# Patient Record
Sex: FEMALE | Race: ASIAN | NOT HISPANIC OR LATINO | Employment: UNEMPLOYED | URBAN - METROPOLITAN AREA
[De-identification: names, ages, dates, MRNs, and addresses within clinical notes are randomized per-mention and may not be internally consistent; named-entity substitution may affect disease eponyms.]

---

## 2018-05-09 ENCOUNTER — OFFICE VISIT (OUTPATIENT)
Dept: MEDICAL GROUP | Facility: MEDICAL CENTER | Age: 33
End: 2018-05-09
Payer: COMMERCIAL

## 2018-05-09 VITALS
HEART RATE: 74 BPM | RESPIRATION RATE: 16 BRPM | OXYGEN SATURATION: 95 % | HEIGHT: 64 IN | BODY MASS INDEX: 25.22 KG/M2 | DIASTOLIC BLOOD PRESSURE: 70 MMHG | SYSTOLIC BLOOD PRESSURE: 118 MMHG | WEIGHT: 147.71 LBS | TEMPERATURE: 98.5 F

## 2018-05-09 DIAGNOSIS — R87.612 LGSIL ON PAP SMEAR OF CERVIX: ICD-10-CM

## 2018-05-09 DIAGNOSIS — R94.4 KIDNEY FUNCTION TEST ABNORMAL: ICD-10-CM

## 2018-05-09 DIAGNOSIS — Z86.59 HISTORY OF DEPRESSION: ICD-10-CM

## 2018-05-09 DIAGNOSIS — Z76.89 ENCOUNTER TO ESTABLISH CARE: ICD-10-CM

## 2018-05-09 DIAGNOSIS — Z86.69 HISTORY OF MIGRAINE: ICD-10-CM

## 2018-05-09 PROCEDURE — 99203 OFFICE O/P NEW LOW 30 MIN: CPT | Performed by: PHYSICIAN ASSISTANT

## 2018-05-09 ASSESSMENT — PATIENT HEALTH QUESTIONNAIRE - PHQ9: CLINICAL INTERPRETATION OF PHQ2 SCORE: 0

## 2018-05-10 PROBLEM — Z86.59 HISTORY OF DEPRESSION: Status: ACTIVE | Noted: 2018-05-10

## 2018-05-10 PROBLEM — Z86.69 HISTORY OF MIGRAINE: Status: ACTIVE | Noted: 2018-05-10

## 2018-05-10 NOTE — ASSESSMENT & PLAN NOTE
Most recent lab testing through work fall 2017 showed abnormal kidney function numbers. Unknown cause. No known hx of kidney disease. No hx of diabetes. Doesn't usually take ibuprofen or other NSAIDs. Would like to have retested.

## 2018-05-10 NOTE — PROGRESS NOTES
Chief Complaint   Patient presents with   • Establish Care   • Results     review labs   • Contraception     wants test to confirm if pregnant        HISTORY OF THE PRESENT ILLNESS: This is a 32 y.o. female new patient to our practice. This pleasant patient is here today to establish care. New to the area. Works in marketing.  got job at [email or web address] which is why they moved to [place]. Trying to get pregnant    LGSIL on Pap smear of cervix  2017. Before that all previous paps have been normal. No follow up as she moved after that test. Agrees to GYN referral now    Kidney function test abnormal  Most recent lab testing through work 2017 showed abnormal kidney function numbers. Unknown cause. No known hx of kidney disease. No hx of diabetes. Doesn't usually take ibuprofen or other NSAIDs. Would like to have retested.    History of migraine  Not currently, mostly related to drinking alcohol so just avoids that    History of depression  No current symptoms. Managed in the past with therapy      Past Medical History:   Diagnosis Date   • Depression    • Environmental allergies    • Migraines        History reviewed. No pertinent surgical history.    Family Status   Relation Status   • Mother Alive   • Father Alive   History reviewed. No pertinent family history.    Social History   Substance Use Topics   • Smoking status: Never Smoker   • Smokeless tobacco: Never Used   • Alcohol use Yes       Allergies: Patient has no known allergies.    Current Outpatient Prescriptions Ordered in [facility]   Medication Sig Dispense Refill   • Prenatal Multivit-Min-Fe-FA (PRE- PO) Take  by mouth.       No current Epic-ordered facility-administered medications on file.        Review of Systems   Constitutional: Negative for fever, chills, weight loss and malaise/fatigue.   HENT: Negative for ear pain, nosebleeds, congestion, sore throat and neck pain.    Eyes: Negative for blurred vision.   Respiratory: Negative for cough, sputum  "production, shortness of breath and wheezing.    Cardiovascular: Negative for chest pain, palpitations, orthopnea and leg swelling.   Gastrointestinal: Negative for heartburn, nausea, vomiting and abdominal pain.   Genitourinary: Negative for dysuria, urgency and frequency.   Musculoskeletal: Negative for myalgias, back pain and joint pain.   Skin: Negative for rash and itching.   Neurological: Negative for dizziness, tingling, tremors, sensory change, focal weakness and headaches.   Endo/Heme/Allergies: Does not bruise/bleed easily.   Psychiatric/Behavioral: Negative for depression, anxiety, or memory loss.     All other systems reviewed and are negative except as in HPI.    Exam: Blood pressure 118/70, pulse 74, temperature 36.9 °C (98.5 °F), resp. rate 16, height 1.626 m (5' 4\"), weight 67 kg (147 lb 11.3 oz), last menstrual period 04/14/2018, SpO2 95 %, not currently breastfeeding.  General: Normal appearing. No distress.  Pulmonary: Clear to ausculation.  Normal effort. No rales, ronchi, or wheezing.  Cardiovascular: Regular rate and rhythm without murmur. Carotid and radial pulses are intact and equal bilaterally.  Neurologic: Grossly nonfocal  Skin: Warm and dry.  No obvious lesions.  Musculoskeletal: Normal gait. No extremity cyanosis, clubbing, or edema.  Psych: Normal mood and affect. Alert and oriented x3. Judgment and insight is normal.  Assessment/Plan  1. Encounter to establish care     2. LGSIL on Pap smear of cervix  REFERRAL TO OB/GYN    HCG QUANTITATIVE   3. Kidney function test abnormal  COMP METABOLIC PANEL    MICROALBUMIN CREAT RATIO URINE    URINALYSIS,CULTURE IF INDICATED   4. History of migraine     5. History of depression       Advised to not try to get pregnant currently until she follows up with GYN on LGSIL. Patient agrees. Will contact patient with lab results.  "

## 2018-05-10 NOTE — ASSESSMENT & PLAN NOTE
9/25/2017. Before that all previous paps have been normal. No follow up as she moved after that test. Agrees to GYN referral now

## 2018-05-12 ENCOUNTER — OFFICE VISIT (OUTPATIENT)
Dept: URGENT CARE | Facility: PHYSICIAN GROUP | Age: 33
End: 2018-05-12
Payer: COMMERCIAL

## 2018-05-12 ENCOUNTER — HOSPITAL ENCOUNTER (OUTPATIENT)
Facility: MEDICAL CENTER | Age: 33
End: 2018-05-12
Attending: PHYSICIAN ASSISTANT
Payer: COMMERCIAL

## 2018-05-12 VITALS
HEIGHT: 64 IN | RESPIRATION RATE: 16 BRPM | WEIGHT: 148 LBS | SYSTOLIC BLOOD PRESSURE: 116 MMHG | BODY MASS INDEX: 25.27 KG/M2 | OXYGEN SATURATION: 98 % | TEMPERATURE: 98.2 F | DIASTOLIC BLOOD PRESSURE: 68 MMHG | HEART RATE: 75 BPM

## 2018-05-12 DIAGNOSIS — N30.00 ACUTE CYSTITIS WITHOUT HEMATURIA: ICD-10-CM

## 2018-05-12 DIAGNOSIS — Z32.01 POSITIVE URINE PREGNANCY TEST: ICD-10-CM

## 2018-05-12 DIAGNOSIS — R30.0 DYSURIA: ICD-10-CM

## 2018-05-12 LAB
APPEARANCE UR: CLEAR
BILIRUB UR STRIP-MCNC: NORMAL MG/DL
COLOR UR AUTO: YELLOW
GLUCOSE UR STRIP.AUTO-MCNC: NORMAL MG/DL
INT CON NEG: NORMAL
INT CON POS: NORMAL
KETONES UR STRIP.AUTO-MCNC: NORMAL MG/DL
LEUKOCYTE ESTERASE UR QL STRIP.AUTO: NORMAL
NITRITE UR QL STRIP.AUTO: NORMAL
PH UR STRIP.AUTO: 5 [PH] (ref 5–8)
POC URINE PREGNANCY TEST: POSITIVE
PROT UR QL STRIP: NORMAL MG/DL
RBC UR QL AUTO: NORMAL
SP GR UR STRIP.AUTO: 1.01
UROBILINOGEN UR STRIP-MCNC: NORMAL MG/DL

## 2018-05-12 PROCEDURE — 81002 URINALYSIS NONAUTO W/O SCOPE: CPT | Performed by: PHYSICIAN ASSISTANT

## 2018-05-12 PROCEDURE — 81025 URINE PREGNANCY TEST: CPT | Performed by: PHYSICIAN ASSISTANT

## 2018-05-12 PROCEDURE — 99204 OFFICE O/P NEW MOD 45 MIN: CPT | Performed by: PHYSICIAN ASSISTANT

## 2018-05-12 PROCEDURE — 87086 URINE CULTURE/COLONY COUNT: CPT

## 2018-05-12 RX ORDER — CEPHALEXIN 500 MG/1
500 CAPSULE ORAL 2 TIMES DAILY
Qty: 10 CAP | Refills: 0 | Status: SHIPPED | OUTPATIENT
Start: 2018-05-12 | End: 2018-05-17

## 2018-05-12 ASSESSMENT — ENCOUNTER SYMPTOMS
ABDOMINAL PAIN: 0
DIARRHEA: 0
FEVER: 0
CHILLS: 0
VOMITING: 0
RESPIRATORY NEGATIVE: 1
NAUSEA: 0
FLANK PAIN: 0
CARDIOVASCULAR NEGATIVE: 1

## 2018-05-12 NOTE — PROGRESS NOTES
"Subjective:      Ana Marroquin is a 32 y.o. female who presents with Dysuria (frequency x 4 days- recent positive pregnancy test)      LMP:  - . 3 home pregnancy tests positive.        Dysuria    This is a new problem. The current episode started in the past 7 days (4 days). The problem occurs every urination. The problem has been unchanged. The quality of the pain is described as burning. There has been no fever. The fever has been present for less than 1 day. There is no history of pyelonephritis. Associated symptoms include frequency and a possible pregnancy. Pertinent negatives include no chills, flank pain, hematuria, hesitancy, nausea, urgency or vomiting. She has tried nothing for the symptoms. The treatment provided no relief.       PMH:  has a past medical history of Depression; Environmental allergies; and Migraines.  MEDS:   Current Outpatient Prescriptions:   •  Prenatal Multivit-Min-Fe-FA (PRE-JONATAN PO), Take  by mouth., Disp: , Rfl:   ALLERGIES: No Known Allergies  SURGHX: No past surgical history on file.  SOCHX:  reports that she has never smoked. She has never used smokeless tobacco. She reports that she drinks alcohol. She reports that she does not use drugs.  FH: family history is not on file.      Review of Systems   Constitutional: Negative for chills and fever.   HENT: Negative.    Respiratory: Negative.    Cardiovascular: Negative.    Gastrointestinal: Negative for abdominal pain, diarrhea, nausea and vomiting.   Genitourinary: Positive for dysuria and frequency. Negative for flank pain, hematuria, hesitancy and urgency.   All other systems reviewed and are negative.      Medications, Allergies, and current problem list reviewed today in Epic  Family history reviewed with patient and is not pertinent for today's visit     Objective:     /68   Pulse 75   Temp 36.8 °C (98.2 °F)   Resp 16   Ht 1.626 m (5' 4\")   Wt 67.1 kg (148 lb)   LMP 04/15/2018   SpO2 98%   BMI 25.40 " kg/m²      Physical Exam   Constitutional: She is oriented to person, place, and time. She appears well-developed and well-nourished. No distress.   HENT:   Head: Normocephalic and atraumatic.   Eyes: Conjunctivae and EOM are normal.   Neck: Normal range of motion. Neck supple.   Cardiovascular: Normal rate, regular rhythm and normal heart sounds.    Pulmonary/Chest: Effort normal and breath sounds normal. No respiratory distress. She has no wheezes. She has no rales.   Abdominal: Soft. She exhibits no distension. There is no tenderness.   Musculoskeletal:   No flank pain or CVA tenderness   Neurological: She is alert and oriented to person, place, and time.   Skin: Skin is warm and dry. She is not diaphoretic.   Psychiatric: She has a normal mood and affect. Her behavior is normal. Judgment and thought content normal.   Nursing note and vitals reviewed.         Urinalysis: Positive leukocyte esterase  Pregnancy: Positive     Assessment/Plan:     1. Acute cystitis without hematuria  Urine Culture    cephALEXin (KEFLEX) 500 MG Cap   2. Dysuria  POCT Urinalysis    POCT PREGNANCY   3. Positive urine pregnancy test       Early pregnancy. Proper lab work already ordered by her PCP. Vital signs normal, no signs of pyelonephritis  Keflex chosen because patient is early in pregnancy.  Take full course of antibiotic  Urine culture, I will only call patient if I need to change antibiotic pending results  Significantly increase fluids  Cranberry as tolerated  Return to clinic or go to ED if symptoms worsen or persist. Indications for ED discussed at length. Patient voices understanding. Follow-up with your primary care provider in 3-5 days. Red flags discussed. All side effects of medication discussed including allergic response, GI upset, tendon injury, etc.    Please note that this dictation was created using voice recognition software. I have made every reasonable attempt to correct obvious errors, but I expect that there  are errors of grammar and possibly content that I did not discover before finalizing the note.

## 2018-05-13 DIAGNOSIS — N30.00 ACUTE CYSTITIS WITHOUT HEMATURIA: ICD-10-CM

## 2018-05-15 LAB
BACTERIA UR CULT: NORMAL
SIGNIFICANT IND 70042: NORMAL
SITE SITE: NORMAL
SOURCE SOURCE: NORMAL

## 2018-05-16 ENCOUNTER — HOSPITAL ENCOUNTER (OUTPATIENT)
Dept: LAB | Facility: MEDICAL CENTER | Age: 33
End: 2018-05-16
Attending: PHYSICIAN ASSISTANT
Payer: COMMERCIAL

## 2018-05-16 DIAGNOSIS — R94.4 KIDNEY FUNCTION TEST ABNORMAL: ICD-10-CM

## 2018-05-16 DIAGNOSIS — R87.612 LGSIL ON PAP SMEAR OF CERVIX: ICD-10-CM

## 2018-05-16 LAB
ALBUMIN SERPL BCP-MCNC: 4.2 G/DL (ref 3.2–4.9)
ALBUMIN/GLOB SERPL: 1.3 G/DL
ALP SERPL-CCNC: 53 U/L (ref 30–99)
ALT SERPL-CCNC: 13 U/L (ref 2–50)
ANION GAP SERPL CALC-SCNC: 5 MMOL/L (ref 0–11.9)
APPEARANCE UR: CLEAR
AST SERPL-CCNC: 16 U/L (ref 12–45)
B-HCG SERPL-ACNC: 718.3 MIU/ML (ref 0–5)
BILIRUB SERPL-MCNC: 0.4 MG/DL (ref 0.1–1.5)
BILIRUB UR QL STRIP.AUTO: NEGATIVE
BUN SERPL-MCNC: 14 MG/DL (ref 8–22)
CALCIUM SERPL-MCNC: 9.8 MG/DL (ref 8.5–10.5)
CHLORIDE SERPL-SCNC: 103 MMOL/L (ref 96–112)
CO2 SERPL-SCNC: 27 MMOL/L (ref 20–33)
COLOR UR: YELLOW
CREAT SERPL-MCNC: 0.6 MG/DL (ref 0.5–1.4)
CREAT UR-MCNC: 142.8 MG/DL
GLOBULIN SER CALC-MCNC: 3.3 G/DL (ref 1.9–3.5)
GLUCOSE SERPL-MCNC: 97 MG/DL (ref 65–99)
GLUCOSE UR STRIP.AUTO-MCNC: NEGATIVE MG/DL
KETONES UR STRIP.AUTO-MCNC: NEGATIVE MG/DL
LEUKOCYTE ESTERASE UR QL STRIP.AUTO: NEGATIVE
MICRO URNS: NORMAL
MICROALBUMIN UR-MCNC: <0.7 MG/DL
MICROALBUMIN/CREAT UR: NORMAL MG/G (ref 0–30)
NITRITE UR QL STRIP.AUTO: NEGATIVE
PH UR STRIP.AUTO: 6 [PH]
POTASSIUM SERPL-SCNC: 4.3 MMOL/L (ref 3.6–5.5)
PROT SERPL-MCNC: 7.5 G/DL (ref 6–8.2)
PROT UR QL STRIP: NEGATIVE MG/DL
RBC UR QL AUTO: NEGATIVE
SODIUM SERPL-SCNC: 135 MMOL/L (ref 135–145)
SP GR UR STRIP.AUTO: 1.03
UROBILINOGEN UR STRIP.AUTO-MCNC: 0.2 MG/DL

## 2018-05-16 PROCEDURE — 80053 COMPREHEN METABOLIC PANEL: CPT

## 2018-05-16 PROCEDURE — 84702 CHORIONIC GONADOTROPIN TEST: CPT

## 2018-05-16 PROCEDURE — 82043 UR ALBUMIN QUANTITATIVE: CPT

## 2018-05-16 PROCEDURE — 82570 ASSAY OF URINE CREATININE: CPT

## 2018-05-16 PROCEDURE — 36415 COLL VENOUS BLD VENIPUNCTURE: CPT

## 2018-05-16 PROCEDURE — 81003 URINALYSIS AUTO W/O SCOPE: CPT

## 2018-05-17 ENCOUNTER — TELEPHONE (OUTPATIENT)
Dept: MEDICAL GROUP | Facility: MEDICAL CENTER | Age: 33
End: 2018-05-17

## 2018-05-17 DIAGNOSIS — Z3A.01 LESS THAN 8 WEEKS GESTATION OF PREGNANCY: ICD-10-CM

## 2018-05-17 DIAGNOSIS — R87.612 LGSIL ON PAP SMEAR OF CERVIX: ICD-10-CM

## 2018-10-30 ENCOUNTER — APPOINTMENT (OUTPATIENT)
Dept: OTHER | Facility: IMAGING CENTER | Age: 33
End: 2018-10-30

## 2018-11-01 ENCOUNTER — HOSPITAL ENCOUNTER (OUTPATIENT)
Dept: LAB | Facility: MEDICAL CENTER | Age: 33
End: 2018-11-01
Attending: OBSTETRICS & GYNECOLOGY
Payer: COMMERCIAL

## 2018-11-01 PROCEDURE — 87624 HPV HI-RISK TYP POOLED RSLT: CPT

## 2018-11-01 PROCEDURE — 88175 CYTOPATH C/V AUTO FLUID REDO: CPT

## 2018-11-03 LAB — CYTOLOGY REG CYTOL: NORMAL

## 2018-11-09 LAB
HPV HR 12 DNA CVX QL NAA+PROBE: POSITIVE
HPV16 DNA SPEC QL NAA+PROBE: NEGATIVE
HPV18 DNA SPEC QL NAA+PROBE: NEGATIVE
SPECIMEN SOURCE: ABNORMAL

## 2018-11-10 ENCOUNTER — APPOINTMENT (OUTPATIENT)
Dept: OTHER | Facility: IMAGING CENTER | Age: 33
End: 2018-11-10

## 2018-12-09 ENCOUNTER — APPOINTMENT (OUTPATIENT)
Dept: OTHER | Facility: IMAGING CENTER | Age: 33
End: 2018-12-09

## 2018-12-11 ENCOUNTER — HOSPITAL ENCOUNTER (OUTPATIENT)
Dept: LAB | Facility: MEDICAL CENTER | Age: 33
End: 2018-12-11
Attending: OBSTETRICS & GYNECOLOGY
Payer: COMMERCIAL

## 2018-12-11 PROCEDURE — 87150 DNA/RNA AMPLIFIED PROBE: CPT

## 2018-12-11 PROCEDURE — 87081 CULTURE SCREEN ONLY: CPT

## 2018-12-12 LAB — GP B STREP DNA SPEC QL NAA+PROBE: NEGATIVE

## 2019-01-16 ENCOUNTER — HOSPITAL ENCOUNTER (INPATIENT)
Facility: MEDICAL CENTER | Age: 34
LOS: 1 days | End: 2019-01-17
Attending: OBSTETRICS & GYNECOLOGY | Admitting: OBSTETRICS & GYNECOLOGY
Payer: COMMERCIAL

## 2019-01-16 ENCOUNTER — APPOINTMENT (OUTPATIENT)
Dept: OBGYN | Facility: MEDICAL CENTER | Age: 34
End: 2019-01-16
Attending: OBSTETRICS & GYNECOLOGY
Payer: COMMERCIAL

## 2019-01-16 LAB
BASOPHILS # BLD AUTO: 0.4 % (ref 0–1.8)
BASOPHILS # BLD: 0.02 K/UL (ref 0–0.12)
EOSINOPHIL # BLD AUTO: 0.05 K/UL (ref 0–0.51)
EOSINOPHIL NFR BLD: 0.9 % (ref 0–6.9)
ERYTHROCYTE [DISTWIDTH] IN BLOOD BY AUTOMATED COUNT: 46.6 FL (ref 35.9–50)
ERYTHROCYTE [DISTWIDTH] IN BLOOD BY AUTOMATED COUNT: 48.8 FL (ref 35.9–50)
GLUCOSE BLD-MCNC: 121 MG/DL (ref 65–99)
GLUCOSE BLD-MCNC: 95 MG/DL (ref 65–99)
GLUCOSE BLD-MCNC: 99 MG/DL (ref 65–99)
HCT VFR BLD AUTO: 37.6 % (ref 37–47)
HCT VFR BLD AUTO: 38.8 % (ref 37–47)
HGB BLD-MCNC: 12.2 G/DL (ref 12–16)
HGB BLD-MCNC: 12.8 G/DL (ref 12–16)
HOLDING TUBE BB 8507: NORMAL
IMM GRANULOCYTES # BLD AUTO: 0.01 K/UL (ref 0–0.11)
IMM GRANULOCYTES NFR BLD AUTO: 0.2 % (ref 0–0.9)
LYMPHOCYTES # BLD AUTO: 1.22 K/UL (ref 1–4.8)
LYMPHOCYTES NFR BLD: 22.8 % (ref 22–41)
MCH RBC QN AUTO: 28.6 PG (ref 27–33)
MCH RBC QN AUTO: 29.3 PG (ref 27–33)
MCHC RBC AUTO-ENTMCNC: 32.4 G/DL (ref 33.6–35)
MCHC RBC AUTO-ENTMCNC: 33 G/DL (ref 33.6–35)
MCV RBC AUTO: 86.8 FL (ref 81.4–97.8)
MCV RBC AUTO: 90.2 FL (ref 81.4–97.8)
MONOCYTES # BLD AUTO: 0.47 K/UL (ref 0–0.85)
MONOCYTES NFR BLD AUTO: 8.8 % (ref 0–13.4)
NEUTROPHILS # BLD AUTO: 3.58 K/UL (ref 2–7.15)
NEUTROPHILS NFR BLD: 66.9 % (ref 44–72)
NRBC # BLD AUTO: 0 K/UL
NRBC BLD-RTO: 0 /100 WBC
PLATELET # BLD AUTO: 148 K/UL (ref 164–446)
PLATELET # BLD AUTO: 152 K/UL (ref 164–446)
PMV BLD AUTO: 12.8 FL (ref 9–12.9)
PMV BLD AUTO: 12.9 FL (ref 9–12.9)
RBC # BLD AUTO: 4.17 M/UL (ref 4.2–5.4)
RBC # BLD AUTO: 4.47 M/UL (ref 4.2–5.4)
WBC # BLD AUTO: 5.4 K/UL (ref 4.8–10.8)
WBC # BLD AUTO: 9.1 K/UL (ref 4.8–10.8)

## 2019-01-16 PROCEDURE — 700111 HCHG RX REV CODE 636 W/ 250 OVERRIDE (IP): Performed by: OBSTETRICS & GYNECOLOGY

## 2019-01-16 PROCEDURE — 700102 HCHG RX REV CODE 250 W/ 637 OVERRIDE(OP): Performed by: OBSTETRICS & GYNECOLOGY

## 2019-01-16 PROCEDURE — 85027 COMPLETE CBC AUTOMATED: CPT

## 2019-01-16 PROCEDURE — 770002 HCHG ROOM/CARE - OB PRIVATE (112)

## 2019-01-16 PROCEDURE — 304965 HCHG RECOVERY SERVICES

## 2019-01-16 PROCEDURE — 3E033VJ INTRODUCTION OF OTHER HORMONE INTO PERIPHERAL VEIN, PERCUTANEOUS APPROACH: ICD-10-PCS | Performed by: OBSTETRICS & GYNECOLOGY

## 2019-01-16 PROCEDURE — 0KQM0ZZ REPAIR PERINEUM MUSCLE, OPEN APPROACH: ICD-10-PCS | Performed by: OBSTETRICS & GYNECOLOGY

## 2019-01-16 PROCEDURE — 10907ZC DRAINAGE OF AMNIOTIC FLUID, THERAPEUTIC FROM PRODUCTS OF CONCEPTION, VIA NATURAL OR ARTIFICIAL OPENING: ICD-10-PCS | Performed by: OBSTETRICS & GYNECOLOGY

## 2019-01-16 PROCEDURE — 700105 HCHG RX REV CODE 258: Performed by: OBSTETRICS & GYNECOLOGY

## 2019-01-16 PROCEDURE — 59409 OBSTETRICAL CARE: CPT

## 2019-01-16 PROCEDURE — 303615 HCHG EPIDURAL/SPINAL ANESTHESIA FOR LABOR

## 2019-01-16 PROCEDURE — 85025 COMPLETE CBC W/AUTO DIFF WBC: CPT

## 2019-01-16 PROCEDURE — A9270 NON-COVERED ITEM OR SERVICE: HCPCS | Performed by: OBSTETRICS & GYNECOLOGY

## 2019-01-16 PROCEDURE — 82962 GLUCOSE BLOOD TEST: CPT | Mod: 91

## 2019-01-16 PROCEDURE — 36415 COLL VENOUS BLD VENIPUNCTURE: CPT

## 2019-01-16 PROCEDURE — 700111 HCHG RX REV CODE 636 W/ 250 OVERRIDE (IP)

## 2019-01-16 RX ORDER — SODIUM CHLORIDE, SODIUM LACTATE, POTASSIUM CHLORIDE, AND CALCIUM CHLORIDE .6; .31; .03; .02 G/100ML; G/100ML; G/100ML; G/100ML
250 INJECTION, SOLUTION INTRAVENOUS PRN
Status: DISCONTINUED | OUTPATIENT
Start: 2019-01-16 | End: 2019-01-16 | Stop reason: HOSPADM

## 2019-01-16 RX ORDER — DOCUSATE SODIUM 100 MG/1
100 CAPSULE, LIQUID FILLED ORAL 2 TIMES DAILY PRN
Status: DISCONTINUED | OUTPATIENT
Start: 2019-01-16 | End: 2019-01-17 | Stop reason: HOSPADM

## 2019-01-16 RX ORDER — ROPIVACAINE HYDROCHLORIDE 2 MG/ML
INJECTION, SOLUTION EPIDURAL; INFILTRATION; PERINEURAL
Status: COMPLETED
Start: 2019-01-16 | End: 2019-01-16

## 2019-01-16 RX ORDER — METHYLERGONOVINE MALEATE 0.2 MG/ML
0.2 INJECTION INTRAVENOUS
Status: DISCONTINUED | OUTPATIENT
Start: 2019-01-16 | End: 2019-01-17 | Stop reason: HOSPADM

## 2019-01-16 RX ORDER — SODIUM CHLORIDE, SODIUM LACTATE, POTASSIUM CHLORIDE, CALCIUM CHLORIDE 600; 310; 30; 20 MG/100ML; MG/100ML; MG/100ML; MG/100ML
INJECTION, SOLUTION INTRAVENOUS PRN
Status: DISCONTINUED | OUTPATIENT
Start: 2019-01-16 | End: 2019-01-17 | Stop reason: HOSPADM

## 2019-01-16 RX ORDER — ONDANSETRON 2 MG/ML
4 INJECTION INTRAMUSCULAR; INTRAVENOUS EVERY 6 HOURS PRN
Status: DISCONTINUED | OUTPATIENT
Start: 2019-01-16 | End: 2019-01-17 | Stop reason: HOSPADM

## 2019-01-16 RX ORDER — IBUPROFEN 600 MG/1
600 TABLET ORAL EVERY 6 HOURS PRN
Status: DISCONTINUED | OUTPATIENT
Start: 2019-01-16 | End: 2019-01-16

## 2019-01-16 RX ORDER — ONDANSETRON 4 MG/1
4 TABLET, ORALLY DISINTEGRATING ORAL EVERY 6 HOURS PRN
Status: DISCONTINUED | OUTPATIENT
Start: 2019-01-16 | End: 2019-01-17 | Stop reason: HOSPADM

## 2019-01-16 RX ORDER — VITAMIN A ACETATE, BETA CAROTENE, ASCORBIC ACID, CHOLECALCIFEROL, .ALPHA.-TOCOPHEROL ACETATE, DL-, THIAMINE MONONITRATE, RIBOFLAVIN, NIACINAMIDE, PYRIDOXINE HYDROCHLORIDE, FOLIC ACID, CYANOCOBALAMIN, CALCIUM CARBONATE, FERROUS FUMARATE, ZINC OXIDE, CUPRIC OXIDE 3080; 12; 120; 400; 1; 1.84; 3; 20; 22; 920; 25; 200; 27; 10; 2 [IU]/1; UG/1; MG/1; [IU]/1; MG/1; MG/1; MG/1; MG/1; MG/1; [IU]/1; MG/1; MG/1; MG/1; MG/1; MG/1
1 TABLET, FILM COATED ORAL EVERY MORNING
Status: DISCONTINUED | OUTPATIENT
Start: 2019-01-17 | End: 2019-01-17 | Stop reason: HOSPADM

## 2019-01-16 RX ORDER — ROPIVACAINE HYDROCHLORIDE 2 MG/ML
INJECTION, SOLUTION EPIDURAL; INFILTRATION; PERINEURAL CONTINUOUS
Status: DISCONTINUED | OUTPATIENT
Start: 2019-01-16 | End: 2019-01-16

## 2019-01-16 RX ORDER — DEXTROSE, SODIUM CHLORIDE, SODIUM LACTATE, POTASSIUM CHLORIDE, AND CALCIUM CHLORIDE 5; .6; .31; .03; .02 G/100ML; G/100ML; G/100ML; G/100ML; G/100ML
INJECTION, SOLUTION INTRAVENOUS CONTINUOUS
Status: DISCONTINUED | OUTPATIENT
Start: 2019-01-16 | End: 2019-01-16

## 2019-01-16 RX ORDER — OXYCODONE HYDROCHLORIDE AND ACETAMINOPHEN 5; 325 MG/1; MG/1
1 TABLET ORAL EVERY 4 HOURS PRN
Status: DISCONTINUED | OUTPATIENT
Start: 2019-01-16 | End: 2019-01-17 | Stop reason: HOSPADM

## 2019-01-16 RX ORDER — LIDOCAINE HYDROCHLORIDE AND EPINEPHRINE 15; 5 MG/ML; UG/ML
INJECTION, SOLUTION EPIDURAL
Status: DISCONTINUED
Start: 2019-01-16 | End: 2019-01-16 | Stop reason: HOSPADM

## 2019-01-16 RX ORDER — CARBOPROST TROMETHAMINE 250 UG/ML
250 INJECTION, SOLUTION INTRAMUSCULAR
Status: DISCONTINUED | OUTPATIENT
Start: 2019-01-16 | End: 2019-01-17 | Stop reason: HOSPADM

## 2019-01-16 RX ORDER — SODIUM CHLORIDE, SODIUM LACTATE, POTASSIUM CHLORIDE, AND CALCIUM CHLORIDE .6; .31; .03; .02 G/100ML; G/100ML; G/100ML; G/100ML
1000 INJECTION, SOLUTION INTRAVENOUS
Status: DISCONTINUED | OUTPATIENT
Start: 2019-01-16 | End: 2019-01-16 | Stop reason: HOSPADM

## 2019-01-16 RX ORDER — OXYCODONE HYDROCHLORIDE AND ACETAMINOPHEN 5; 325 MG/1; MG/1
2 TABLET ORAL EVERY 4 HOURS PRN
Status: DISCONTINUED | OUTPATIENT
Start: 2019-01-16 | End: 2019-01-17 | Stop reason: HOSPADM

## 2019-01-16 RX ORDER — MISOPROSTOL 200 UG/1
800 TABLET ORAL
Status: DISCONTINUED | OUTPATIENT
Start: 2019-01-16 | End: 2019-01-16 | Stop reason: HOSPADM

## 2019-01-16 RX ORDER — MISOPROSTOL 200 UG/1
800 TABLET ORAL
Status: DISCONTINUED | OUTPATIENT
Start: 2019-01-16 | End: 2019-01-17 | Stop reason: HOSPADM

## 2019-01-16 RX ORDER — HYDROCODONE BITARTRATE AND ACETAMINOPHEN 5; 325 MG/1; MG/1
1-2 TABLET ORAL EVERY 6 HOURS PRN
Status: DISCONTINUED | OUTPATIENT
Start: 2019-01-16 | End: 2019-01-17 | Stop reason: HOSPADM

## 2019-01-16 RX ORDER — IBUPROFEN 600 MG/1
600 TABLET ORAL EVERY 6 HOURS PRN
Status: DISCONTINUED | OUTPATIENT
Start: 2019-01-16 | End: 2019-01-17 | Stop reason: HOSPADM

## 2019-01-16 RX ORDER — BUPIVACAINE HYDROCHLORIDE 2.5 MG/ML
INJECTION, SOLUTION EPIDURAL; INFILTRATION; INTRACAUDAL
Status: DISCONTINUED
Start: 2019-01-16 | End: 2019-01-16 | Stop reason: HOSPADM

## 2019-01-16 RX ORDER — SODIUM CHLORIDE, SODIUM LACTATE, POTASSIUM CHLORIDE, CALCIUM CHLORIDE 600; 310; 30; 20 MG/100ML; MG/100ML; MG/100ML; MG/100ML
INJECTION, SOLUTION INTRAVENOUS CONTINUOUS
Status: DISPENSED | OUTPATIENT
Start: 2019-01-16 | End: 2019-01-16

## 2019-01-16 RX ORDER — ALUMINA, MAGNESIA, AND SIMETHICONE 2400; 2400; 240 MG/30ML; MG/30ML; MG/30ML
30 SUSPENSION ORAL EVERY 6 HOURS PRN
Status: DISCONTINUED | OUTPATIENT
Start: 2019-01-16 | End: 2019-01-16 | Stop reason: HOSPADM

## 2019-01-16 RX ADMIN — Medication 1 MILLI-UNITS/MIN: at 06:16

## 2019-01-16 RX ADMIN — SODIUM CHLORIDE, POTASSIUM CHLORIDE, SODIUM LACTATE AND CALCIUM CHLORIDE: 600; 310; 30; 20 INJECTION, SOLUTION INTRAVENOUS at 06:16

## 2019-01-16 RX ADMIN — IBUPROFEN 600 MG: 600 TABLET, FILM COATED ORAL at 22:09

## 2019-01-16 RX ADMIN — ROPIVACAINE HYDROCHLORIDE 100 ML: 2 INJECTION, SOLUTION EPIDURAL; INFILTRATION; PERINEURAL at 11:02

## 2019-01-16 RX ADMIN — SODIUM CHLORIDE, POTASSIUM CHLORIDE, SODIUM LACTATE AND CALCIUM CHLORIDE: 600; 310; 30; 20 INJECTION, SOLUTION INTRAVENOUS at 11:12

## 2019-01-16 RX ADMIN — ROPIVACAINE HYDROCHLORIDE 100 ML: 2 INJECTION, SOLUTION EPIDURAL; INFILTRATION at 11:02

## 2019-01-16 RX ADMIN — Medication 125 ML/HR: at 14:32

## 2019-01-16 RX ADMIN — SODIUM CHLORIDE, POTASSIUM CHLORIDE, SODIUM LACTATE AND CALCIUM CHLORIDE: 600; 310; 30; 20 INJECTION, SOLUTION INTRAVENOUS at 10:45

## 2019-01-16 ASSESSMENT — PATIENT HEALTH QUESTIONNAIRE - PHQ9
2. FEELING DOWN, DEPRESSED, IRRITABLE, OR HOPELESS: NOT AT ALL
SUM OF ALL RESPONSES TO PHQ9 QUESTIONS 1 AND 2: 0
2. FEELING DOWN, DEPRESSED, IRRITABLE, OR HOPELESS: NOT AT ALL
SUM OF ALL RESPONSES TO PHQ9 QUESTIONS 1 AND 2: 0
1. LITTLE INTEREST OR PLEASURE IN DOING THINGS: NOT AT ALL
1. LITTLE INTEREST OR PLEASURE IN DOING THINGS: NOT AT ALL

## 2019-01-16 ASSESSMENT — LIFESTYLE VARIABLES
EVER_SMOKED: NEVER
ALCOHOL_USE: NO

## 2019-01-16 ASSESSMENT — PAIN SCALES - GENERAL
PAINLEVEL_OUTOF10: 0
PAINLEVEL_OUTOF10: 0
PAINLEVEL_OUTOF10: 4

## 2019-01-16 ASSESSMENT — COPD QUESTIONNAIRES
HAVE YOU SMOKED AT LEAST 100 CIGARETTES IN YOUR ENTIRE LIFE: NO/DON'T KNOW
DO YOU EVER COUGH UP ANY MUCUS OR PHLEGM?: NO/ONLY WITH OCCASIONAL COLDS OR INFECTIONS
IN THE PAST 12 MONTHS DO YOU DO LESS THAN YOU USED TO BECAUSE OF YOUR BREATHING PROBLEMS: DISAGREE/UNSURE
DURING THE PAST 4 WEEKS HOW MUCH DID YOU FEEL SHORT OF BREATH: NONE/LITTLE OF THE TIME

## 2019-01-16 NOTE — H&P
DATE OF ADMISSION:  2019    ADMITTING DIAGNOSES:  1.  Intrauterine pregnancy at 39 and 4/7th weeks.  2.  Gestational diabetes mellitus type A2.    HISTORY:  This is a 33-year-old  1, para 0 with an estimated date of   confinement of 2019 making her 39 and 4/7th weeks who presents to labor   and delivery this morning for induction of labor.  Her pregnancy has been   complicated by gestational diabetes mellitus type A2 and the recommendation   was made to deliver between 39 and 40 weeks.  She presents to labor and   delivery this morning for this.  She understands there is an increased risk of   an operative delivery including  section with induction of labor.    All of her questions were answered and she agrees to induction of labor.    Prenatal care has been with Dr. Castanon.  Estimated date of confinement of   2019 was established by ultrasound in the first trimester.    PRENATAL LABS:  Her blood type is B positive, antibody screen negative, RPR   nonreactive, rubella immune, hepatitis B surface antigen negative, hepatitis C   negative, HIV negative.  Her 1-hour Glucola was abnormal.  Her 3-hour glucose   tolerance test 2 out of 4 was abnormal.  Group B strep status is negative.    She had sequential screening performed, which was low risk carrier.  Cystic   fibrosis screen was negative.    COMPLICATIONS WITH THE PREGNANCY:  Include transfer of care at 25 weeks.  She   has a posterior placenta, which was previously low lying, which has resolved.    Total weight gain for the pregnancy has been approximately 9 pounds and she   has been diagnosed with gestational diabetes mellitus type A2 and has been   followed by Dr. Espinoza.  She had a history of an ASCUS Pap with positive HPV, but   her 16, 18 and 45 were negative.  The plan is for Pap and colposcopy   postpartum.    ALLERGIES:  She has no known drug allergies.    MEDICATIONS:  Include prenatal vitamins and NPH, most recently 44 units    subcutaneously at bedtime.    PAST MEDICAL HISTORY:  Significant for now gestational diabetes mellitus type   A2.    PAST SURGICAL HISTORY:  Negative.    SOCIAL HISTORY:  She denies tobacco, alcohol or IV drug use.    GYNECOLOGIC HISTORY:  Significant for ASCUS positive HPV Pap test, but no HSV.    OBSTETRICAL HISTORY:   1 is her current pregnancy.    FAMILY HISTORY:  Noncontributory.    PHYSICAL EXAMINATION:  VITAL SIGNS:  Temperature is 98.5, pulse is 82, blood pressure is 121/80.  GENERAL:  She is pleasant, in no apparent distress.  LUNGS:  Clear to auscultation bilaterally.  CARDIOVASCULAR:  Regular rate and rhythm.  ABDOMEN:  Gravid and nontender.  EXTREMITIES:  Show trace pedal edema.  She has no cords or Homans sign.  PELVIC:  Fetal heart tones are in the 130s and category 1 tracing.  Tocometry   shows contractions every 7 minutes.  Cervical exam per the nurse's exam, she   is 3 cm dilated, 60% effaced, -1 station, vertex presentation.  Estimated   fetal weight is 3300 g.    IMPRESSION:  This is a 33-year-old  1, para 0 at 39 and 4/7th weeks,   presenting to labor and delivery for induction of labor secondary to   gestational diabetes mellitus type A2.    PLAN:  1.  The patient has been admitted to labor and delivery.  2.  IV fluids and Pitocin have been started.  3.  Her blood sugars will be checked every 2 hours.  The physician should be   called if her levels are below 60 or greater than 140.  4.  There is currently reassuring fetal surveillance.       ____________________________________     MD HERMAN ZAVALA / PETER    DD:  2019 08:18:51  DT:  2019 08:33:13    D#:  [id number]  Job#:  [id number]

## 2019-01-16 NOTE — PROGRESS NOTES
0530 - 32 y/o  EDC 2019, EGA 39.4, here to L&D room 217 with  Tomas. Pt here for scheduled IOL. EFM/TOCO applied, Patient states positive FM. Denies vaginal LOF or Bleeding. VSS.  0545 - IV started, labs collected. FSBS 127  0600 - SVE - 3/-1. Per MD order will do pitocin IOL.   616 - Pitocin started, See MAR  0700 - Report given to SARA Briggs RN. All questions answered.

## 2019-01-16 NOTE — CARE PLAN
Problem: Safety  Goal: Free from accidental injury  Will do q 2 hr BG while in labor    Problem: Risk for injury  Goal: Patient and fetus will be free of preventable injury/complications    Intervention: Monitor Fetal well being  FHT's monitored continuously

## 2019-01-16 NOTE — PROGRESS NOTES
EDC - 19 EGA - 39.4    0700 - Report received from MONROE Ugarte RN. POC discussed, care assumed.   0705 -  Full Assessment complete. VSS. No complaints of contractions, ROM, or vaginal bleeding at this time. Pt reports good FM. POC discussed with pt and family members, all questions answered.   0733 - . Pt reports that she is starting to feel contractions at this time but they are not painful  0810 - Dr. Castanon at bedside. Update given. No new orders at this time.  0929 - BG 99. Pt sitting on birthing ball. No needs at this time  1030 - pt requesting epidural. Bolus started.  1045 - Dr. Coburn at bedside. Time out called at 1050. Epidural placed at this time by Dr Coburn. Test dose at 1055 - No reaction detected - VSS. Dermatome level of T8. Epidural infusion settings are : 10mL/hr continuous infusion, 5mL bolus every 15 minutes with a 25mL/hr dose limit.   1136 - BG 95  1140 - Carlson placed and draining to gravity. SVE 5-6/80/-1.Category 1 FHT tracing at this time. No complaints at this time.   1208 - Dr. Castanon at bedside. Update given. SVE=7/100/0. AROM lt mec fluid.

## 2019-01-16 NOTE — L&D DELIVERY NOTE
DATE OF SERVICE:  2019    DELIVERY NOTE:  This is a 33-year-old  1, para 0 at 39-4/7 weeks, who   presented to labor and delivery this morning for induction of labor secondary   to gestational diabetes mellitus type A2.  She was admitted to labor and   delivery and started on Pitocin.  She received an epidural for pain   management.  She underwent artificial rupture of membranes for clear fluid.    Throughout labor, fetal heart tones were in the 140s and category 1 tracing;   however, when she progressed to complete, there were some deep variable   decelerations down to the 50s.  Originally, there was good return to baseline   when the patient started pushing; however, 2 pushes prior to delivery, there   was a deceleration persistent bradycardia to the 60s with slow return to   baseline.  At this point, the patient was consented for a vacuum-assisted   vaginal delivery, to which the patient agreed.  Her Carlson catheter had just   been removed, so her bladder was empty and the baby was felt to be in the   occiput anterior position.  So, with the patient pushing, the   physician-controlled Mityvac vacuum was placed on the fetal vertex and with 1   pull and no reapplications, the fetal vertex which was at +4 station, was   easily brought into the  position and at that point, the vacuum was   removed.  There was no evidence of any cephalohematoma or scalp laceration   with the head at the perineum.  Mouth and nose were bulb suctioned.  There was   a very tight nuchal cord, which was clamped twice and cut at the perineum.    The rest of the baby delivered easily with the next push.  There was also   noted to be meconium, so the infant was handed off immediately to respiratory   therapy.  This was a viable female infant, weight 7 pounds, Apgars 7 and 9,   delivered at 1322 hours.  The placenta delivered spontaneously intact with a   3-vessel cord at 1326 hours, 20 units of IV Pitocin, fundal massage  and   bimanual massage provided good uterine contraction.  Estimated blood loss was   200 mL.  Inspection of the cervix revealed no lacerations.  Inspection of the   perineum revealed a small second-degree laceration.  This was repaired with 0   and 2-0 Vicryl in the usual sterile fashion.  Mother and infant are stable.    The patient will need a 2-hour 75 g oral glucose tolerance test and a   colposcopy at her postpartum visit.    Her blood sugars were checked during labor and were in the 90s.       ____________________________________     MD HERMAN ZAVALA / NTS    DD:  01/16/2019 13:54:52  DT:  01/16/2019 14:13:38    D#:  [id number]  Job#:  [id number]

## 2019-01-16 NOTE — PROGRESS NOTES
S:  Pt received epidural.  Discussed AROM - pt agrees    O:  VSS - afebrile  FHT 140s Cat 1  [place]: Q 2  Pit 7 BS 95  Cx: 7/100/0 AROM ? Light mec    A/P:  IUP at 39 4/7 weeks, IOL for GDMA2  - doing well  1.  Labor:  Progressing well  2. FWB - reassuring  3. GDMA2 - under good control

## 2019-01-17 VITALS
OXYGEN SATURATION: 97 % | TEMPERATURE: 98.4 F | HEART RATE: 77 BPM | DIASTOLIC BLOOD PRESSURE: 64 MMHG | RESPIRATION RATE: 20 BRPM | WEIGHT: 180 LBS | HEIGHT: 64 IN | SYSTOLIC BLOOD PRESSURE: 109 MMHG | BODY MASS INDEX: 30.73 KG/M2

## 2019-01-17 PROCEDURE — A9270 NON-COVERED ITEM OR SERVICE: HCPCS | Performed by: OBSTETRICS & GYNECOLOGY

## 2019-01-17 PROCEDURE — 700112 HCHG RX REV CODE 229: Performed by: OBSTETRICS & GYNECOLOGY

## 2019-01-17 PROCEDURE — 700102 HCHG RX REV CODE 250 W/ 637 OVERRIDE(OP): Performed by: OBSTETRICS & GYNECOLOGY

## 2019-01-17 RX ADMIN — IBUPROFEN 600 MG: 600 TABLET, FILM COATED ORAL at 12:32

## 2019-01-17 RX ADMIN — IBUPROFEN 600 MG: 600 TABLET, FILM COATED ORAL at 06:06

## 2019-01-17 RX ADMIN — Medication 1 TABLET: at 06:06

## 2019-01-17 RX ADMIN — DOCUSATE SODIUM 100 MG: 100 CAPSULE, LIQUID FILLED ORAL at 11:11

## 2019-01-17 ASSESSMENT — EDINBURGH POSTNATAL DEPRESSION SCALE (EPDS)
I HAVE FELT SAD OR MISERABLE: NO, NOT AT ALL
THINGS HAVE BEEN GETTING ON TOP OF ME: NO, I HAVE BEEN COPING AS WELL AS EVER
I HAVE BEEN ABLE TO LAUGH AND SEE THE FUNNY SIDE OF THINGS: AS MUCH AS I ALWAYS COULD
I HAVE BLAMED MYSELF UNNECESSARILY WHEN THINGS WENT WRONG: NOT VERY OFTEN
I HAVE FELT SCARED OR PANICKY FOR NO GOOD REASON: NO, NOT AT ALL
I HAVE BEEN SO UNHAPPY THAT I HAVE HAD DIFFICULTY SLEEPING: NOT AT ALL
I HAVE BEEN ANXIOUS OR WORRIED FOR NO GOOD REASON: HARDLY EVER
I HAVE BEEN SO UNHAPPY THAT I HAVE BEEN CRYING: NO, NEVER
THE THOUGHT OF HARMING MYSELF HAS OCCURRED TO ME: NEVER
I HAVE LOOKED FORWARD WITH ENJOYMENT TO THINGS: AS MUCH AS I EVER DID

## 2019-01-17 ASSESSMENT — PAIN SCALES - GENERAL
PAINLEVEL_OUTOF10: 6
PAINLEVEL_OUTOF10: 3
PAINLEVEL_OUTOF10: 0
PAINLEVEL_OUTOF10: 5
PAINLEVEL_OUTOF10: 4
PAINLEVEL_OUTOF10: 2

## 2019-01-17 NOTE — CARE PLAN
Problem: Altered physiologic condition related to immediate post-delivery state and potential for bleeding/hemorrhage  Goal: Patient physiologically stable as evidenced by normal lochia, palpable uterine involution and vital signs within normal limits  Outcome: PROGRESSING AS EXPECTED  Fundus firm, lochia light, vitals stable. Fundal rub performed and second bag of Pitocin still infusing at 125 mL/hr.     Problem: Alteration in comfort related to episiotomy, vaginal repair and/or after birth pains  Goal: Patient is able to ambulate, care for self and infant  Outcome: PROGRESSING AS EXPECTED  Pt states pain is being adequately controlled, able to ambulate and care for self and infant. Encouraged pt to call for PRN pain medications as needed. Pain assessed q2-4 hours.

## 2019-01-17 NOTE — PROGRESS NOTES
Discharge education reviewed. Questions answered at this time. Papers signed and cuddles removed from  as well as clamp. Denies pain medication at this time.

## 2019-01-17 NOTE — PROGRESS NOTES
1720- Report received from MONROE Bedolla and SARA Briggs RN. POC discussed. Assumed care of pt at this time.   1745- Pt up to restroom. Steady gait. Voided. Pericare done at this time.

## 2019-01-17 NOTE — DISCHARGE INSTRUCTIONS
POSTPARTUM DISCHARGE INSTRUCTIONS FOR MOM    YOB: 1985   Age: 33 y.o.               Admit Date: 2019     Discharge Date: 2019  Attending Doctor:  Anabel Castanon M.D.                  Allergies:  Patient has no known allergies.    Discharged to home by car. Discharged via wheelchair, hospital escort: Yes.  Special equipment needed: Not Applicable  Belongings with: Personal  Be sure to schedule a follow-up appointment with your primary care doctor or any specialists as instructed.     Discharge Plan:   Diet Plan: (P) Discussed  Activity Level: (P) Discussed  Confirmed Follow up Appointment: (P) Patient to Call and Schedule Appointment  Confirmed Symptoms Management: (P) Discussed  Medication Reconciliation Updated: (P) Yes  Influenza Vaccine Indication: (P) Not indicated: Previously immunized this influenza season and > 8 years of age    REASONS TO CALL YOUR OBSTETRICIAN:  1.   Persistent fever or shaking chills (Temperature higher than 100.4)  2.   Heavy bleeding (soaking more than 1 pad per hour); Passing clots  3.   Foul odor from vagina  4.   Mastitis (Breast infection; breast pain, chills, fever, redness)  5.   Urinary pain, burning or frequency  6.   Episiotomy infection  7.   Abdominal incision infection  8.   Severe depression longer than 24 hours    HAND WASHING  · Prior to handling the baby.  · Before breastfeeding or bottle feeding baby.  · After using the bathroom or changing the baby's diaper.    WOUND CARE  Ask your physician for additional care instructions.  In general:    ·  Incision:      · Keep clean and dry.    · Do NOT lift anything heavier than your baby for up to 6 weeks.    · There should not be any opening or pus.      VAGINAL CARE  · Nothing inside vagina for 6 weeks: no sexual intercourse, tampons or douching.  · Bleeding may continue for 2-4 weeks.  Amount may vary.    · Call your physician for heavy bleeding which means soaking more than 1 pad per  "hour    BIRTH CONTROL  · It is possible to become pregnant at any time after delivery and while breastfeeding.  · Plan to discuss a method of birth control with your physician at your follow up visit. visit.    DIET AND ELIMINATION  · Eating more fiber (bran cereal, fruits, and vegetables) and drinking plenty of fluids will help to avoid constipation.  · Urinary frequency after childbirth is normal.    POSTPARTUM BLUES  During the first few days after birth, you may experience a sense of the \"blues\" which may include impatience, irritability or even crying.  These feeling come and go quickly.  However, as many as 1 in 10 women experience emotional symptoms known as postpartum depression.    Postpartum depression:  May start as early as the second or third day after delivery or take several weeks or months to develop.  Symptoms of \"blues\" are present, but are more intense:  Crying spells; loss of appetite; feelings of hopelessness or loss of control; fear of touching the baby; over concern or no concern at all about the baby; little or no concern about your own appearance/caring for yourself; and/or inability to sleep or excessive sleeping.  Contact your physician if you are experiencing any of these symptoms.    Crisis Hotline:  · [place] Crisis Hotline:  3-648-PAJZTHJ  Or 1-872.543.9504  · [place] Crisis Hotline:  1-739.810.9271  Or 015-466-6984    PREVENTING SHAKEN BABY:  If you are angry or stressed, PUT THE BABY IN THE CRIB, step away, take some deep breaths, and wait until you are calm to care for the baby.  DO NOT SHAKE THE BABY.  You are not alone, call a supporter for help.    · Crisis Call Center 24/7 crisis line 000-381-6313 or 1-770.640.6266  · You can also text them, text \"ANSWER\" to 474541    QUIT SMOKING/TOBACCO USE:  I understand the use of any tobacco products increases my chance of suffering from future heart disease and could cause other illnesses which may shorten my life. Quitting the use of " tobacco products is the single most important thing I can do to improve my health. For further information on smoking / tobacco cessation call a Toll Free Quit Line at 1-766.890.1274 (*National [employer] [place]) or 1-993.477.6157 (American Lung Association) or you can access the web based program at www.lungusa.org.    · [place] Tobacco Users Help Line:  (500) 921-8691       Toll Free: 1-658.401.9942  · Quit Tobacco Program [facility] (343)764-8092    DEPRESSION / SUICIDE RISK:  As you are discharged from this [facility], it is important to learn how to keep safe from harming yourself.    Recognize the warning signs:  · Abrupt changes in personality, positive or negative- including increase in energy   · Giving away possessions  · Change in eating patterns- significant weight changes-  positive or negative  · Change in sleeping patterns- unable to sleep or sleeping all the time   · Unwillingness or inability to communicate  · Depression  · Unusual sadness, discouragement and loneliness  · Talk of wanting to die  · Neglect of personal appearance   · Rebelliousness- reckless behavior  · Withdrawal from people/activities they love  · Confusion- inability to concentrate     If you or a loved one observes any of these behaviors or has concerns about self-harm, here's what you can do:  · Talk about it- your feelings and reasons for harming yourself  · Remove any means that you might use to hurt yourself (examples: pills, rope, extension cords, firearm)  · Get professional help from the community (Mental Health, Substance Abuse, psychological counseling)  · Do not be alone:Call your Safe Contact- someone whom you trust who will be there for you.  · Call your local CRISIS HOTLINE 539-2838 or 008-011-6114  · Call your local Children's Mobile Crisis Response Team [place] (378) 958-6081 or www.GroupFlier  · Call the toll free National Suicide Prevention Hotlines   · National  Suicide Prevention Lifeline 015-415-LAQL (6661)  · [facility] 800-SUICIDE (915-6700)    DISCHARGE SURVEY:  Thank you for choosing [facility].  We hope we provided you with very good care.  You may be receiving a survey in the mail.  Please fill it out.  Your opinion is valuable to us.    ADDITIONAL EDUCATIONAL MATERIALS GIVEN TO PATIENT:        My signature on this form indicates that:  1.  I have reviewed and understand the above information  2.  My questions regarding this information have been answered to my satisfaction.  3.  I have formulated a plan with my discharge nurse to obtain my prescribed medication for home.

## 2019-01-17 NOTE — PROGRESS NOTES
Patient was admitted to the floor. VSS. Bands and cuddles checked. Patient orientated to the room. Bleeding light and fundus firm. Will monitor.

## 2019-01-17 NOTE — PROGRESS NOTES
Assumed care of patient. Assessment complete. Fundus was shifted to the left a bit; asked patient if she would go to the restroom and then reassess afterwards. After pt used the restroom, finished up assessment. Fundus was now firm, one above the umbilicus, with scant lochia rubra. Denies pain at this time.   With RN assisted MOB with breastfeeding and helped position . [place] was held in football position on the left side. Bed is locked and in low position. Call light left within reach and encouraged to call for any needs if necessary.

## 2019-01-17 NOTE — LACTATION NOTE
This note was copied from a baby's chart.  Physical assessment of baby and mother provided. Introduction to basics of initiating breastfeeding shown at this time to include posture, angle of latch, hand expression, skin to skin and normal  feeding patterns and expectations.Met with parents to review the [facility] [employer] parent education and breastfeeding support book. Encouraged skin to skin time and obtaining support from nursing staff.Discharge resources reviewed.

## 2019-01-17 NOTE — DISCHARGE PLANNING
Discharge Planning Assessment Post Partum     Reason for Referral: History of depression and marijuana.  Address: [address] [place] [address] [place] [address]  Phone: [phone]  Type of Living Situation: living with FOB  Mom Diagnosis: Pregnancy  Baby Diagnosis: [place]  Primary Language: English     Name of Baby: Josee Marroquin  Father of the Baby: Tomas Marroquin   Involved in baby’s care? Yes  Contact Information: [phone]  FOB is employed at [employer]     Prenatal Care: Yes  Mom's PCP: LIZ Sunshine  PCP for new baby: Dr. Chou     Support System: [facility]  Coping/Bonding between mother & baby: Yes  Source of Feeding: breast  Supplies for Infant: prepared for infant; denies any needs     Mom's Insurance: [employer]  Baby Covered on Insurance:Yes  Mother Employed/School: [employer]  Other children in the home/names & ages: 1st baby     Financial Hardship/Income: denies   Mom's Mental status: alert and oriented  Services used prior to admit: None     CPS History: No  Psychiatric History: depression.  Discussed post partum depression and provided MOB with a counseling resource.  Domestic Violence History: denies  Drug/ETOH History: history of marijuana.  MOB denies using during pregnancy.  Infant's UDS is negative.     Resources Provided: children and family resource list, counseling resource for post partum depression  Referrals Made: diaper bank referral provided      Clearance for Discharge: Infant is cleared to discharge home with parents.

## 2019-01-17 NOTE — CARE PLAN
Problem: Altered physiologic condition related to immediate post-delivery state and potential for bleeding/hemorrhage  Goal: Patient physiologically stable as evidenced by normal lochia, palpable uterine involution and vital signs within normal limits  Outcome: PROGRESSING AS EXPECTED  Vitals within parameters. Pt has scant to light lochia rubra. Fundus is firm and one above the umbilicus. Pt encouraged to call with any changes in lochia and pain.     Problem: Potential for postpartum infection related to presence of episiotomy/vaginal tear and/or uterine contamination  Goal: Patient will be absent from signs and symptoms of infection  Outcome: PROGRESSING AS EXPECTED  Vitals have been within parameters. On assessment showing no signs of infection. Bleeding is light and scant.

## 2019-01-17 NOTE — PROGRESS NOTES
PP Day #1    S:  Pt doing well. Minimal lochia.  No problems voiding.  Working on breast feeding.  Wants to go home if baby released.   OTC Motrin for pain.    O: T 97.8 P 83  /69  ABD:  Soft, NT, FF below umb  EXT: Trace pedal edema, no cords or Homans  H/H 12/37  B+ GBS neg    A/P:  PP DAy #1 S/P VAVD at term, GDMA2 - doing well  1.  D/C home  2.  F/U Dr. Castanon 6 weeks for colpo and pp check  3.  OTC Motrin and PNV  4,.  Will needs 2 hour 75 gm OGTT 8 weeks pp

## 2019-01-23 ENCOUNTER — HOSPITAL ENCOUNTER (OUTPATIENT)
Dept: LACTATION | Facility: MEDICAL CENTER | Age: 34
End: 2019-01-23

## 2019-01-23 NOTE — LACTATION NOTE
Infant weight today, day 7: 6#14.2oz  Birthweight 19 was 7#     Concerns: Supply, infants ability to remove milk and quantity of supply.     History: Mom: 33-year-old  1, para 0 with an EDC of 2019 39 and 4/7th weeks pregnancy  complicated by gestational diabetes mellitus type A2. Painful latch in the hospital moved to crack and bleeding at home, started formula and pumping with Lansinoh pump 1x/day and removed 3-4 ounces at that time.  Last pump over 12 hours ago.   Infant without health concerns reported. Taking 1.5oz of formula plus the pumped milk throughout the 24 hours, sometimes fussy after a feeding.  Difficult to settle at nighttime. Using Lansinoh pump from insurance.     Assessment: Moms nipples intact, breasts are soft. Infant oral exam shows nicely cupped tongue that remains cupped with lip pulled back.  Content with exam. No evidence of restricted frenulum. Excellent growth, 2oz from birthweight at day 7.  Mom independently latched bare breast, pain for the first 1 minute, comfortable after that.  Latch shallow until baby worked on in football position. Removed 21ml of the 86 available for about 25% removal rate.  To other breast in cross cradle and discussed latch, tweaking it for a deeper more deliberate latch without pain.  Removed 3ml of the 40 available.  Took 1.3oz for formula after being dressed and changed and alseep. Content throughout the feeding.  Reflux several times, no spit up. Double pumped after for 30 minutes to remove all  milk, transitional color milk.     Plan:  Managing Josee's intake well, continue with evenflow bottles, paced feeding now 1.5-2.5oz according to handout on volumes per weight of baby. 18-20oz per 24 hours including breastfeeding for now.  Pump with hospital grade pump 8x/24 hours until flow slows  Look at each feeding and determine if you will do all three, breast, bottle and pump or bottle and pump.  Nighttime best to bottle and pump.  Needs to  learn to more effectively more milk out of the breast so needs multiple opportunities at the breast throughout the day.  Dad to provide one bottle per late evening as possible while mom sleeps 4-6 hours in a row.  Weight check within 7 days, group is appropriate  Follow up Lactation Consultation in 7-10 days, to adjust plan

## 2019-01-30 ENCOUNTER — HOSPITAL ENCOUNTER (OUTPATIENT)
Dept: LACTATION | Facility: MEDICAL CENTER | Age: 34
End: 2019-01-30

## 2019-01-30 NOTE — LACTATION NOTE
"01/16/2019: 7#  01/23/2019: 6# 14.2oz  01.28.2019:  7#9.3oz 11 oz in 5 days  01.30.2019:   7#13.0   4oz in 2 days, continues 2oz/day    Concern: Follow up to 1/23/19 consult    History since 1/23/19:  Mom breastfeeding then topping off with about 60ml of pumped milk every 3 +/- hours.  Providing about  three 2.5 oz formula bottles at night, mom able to sleep as dad covers \"night shift.\"  Infant goes to the breast easily. No nipple soreness. Baby is fussy/colic.  Keeps in an upright position for 30 minutes after a feeding. She does not like the bassinet. Fed an hour ago, one side only    Assessment.  Healthy growing baby at 2oz per day.  Peeing and pooping regularly.  Moms nipples intact, breasts soft.  Mom latched baby with Josee leal very content to nurse in cross cradle.  Removed 24ml from the right and an additional 18ml from the left and sleeping on moms chest.  Many questions discussed including sleep, transitions, volumes of milk provided, colic, reflux and foods in moms diet    Plan:  Wean off the formula, could actually stop it.  What ever supplement used keep to 2oz  especially if you find her spitting up or uncomfortable after feedings. She may need more than 8 feedings in a day to take in the volume she needs without spitting up.  Decrease pumping from 8x to 6x and then decrease again as that works.  In the morning nurse Josee, don't pump, don't top off,  let her nurse at the next feeding and decide if she needs the top off based on what she took in.  Pump after that feeding to keep supply up.  Home with the rental scale to provide data for predicting next feed and knowing how much to top off and whether to pump. Report back in 24-48 hours.   Colic symptoms should begin to decrease as weight gain is nearer to an ounce per day.   Follow up as needed  Come to group, breastfeeding group.       "

## 2019-02-28 ENCOUNTER — HOSPITAL ENCOUNTER (OUTPATIENT)
Dept: LAB | Facility: MEDICAL CENTER | Age: 34
End: 2019-02-28
Attending: OBSTETRICS & GYNECOLOGY
Payer: COMMERCIAL

## 2019-02-28 LAB
CYTOLOGY REG CYTOL: NORMAL
PATHOLOGY CONSULT NOTE: NORMAL

## 2019-02-28 PROCEDURE — 88175 CYTOPATH C/V AUTO FLUID REDO: CPT

## 2019-02-28 PROCEDURE — 87624 HPV HI-RISK TYP POOLED RSLT: CPT

## 2019-02-28 PROCEDURE — 88305 TISSUE EXAM BY PATHOLOGIST: CPT

## 2019-04-05 ENCOUNTER — HOSPITAL ENCOUNTER (OUTPATIENT)
Dept: LACTATION | Facility: MEDICAL CENTER | Age: 34
End: 2019-04-05

## 2019-04-05 NOTE — LACTATION NOTE
01/23/2019: 6# 14.2oz  01/28/2019:  7#9.3oz 11 oz in 5 days  01/30/2019:   7#13.0   4oz in 2 days, continues 2oz/day  04/05/2019: 10#15.5oz    CC: Slowed growth.    History since 1/30/19:  Weight had slowed down week to week after immunizations and sleeping longer at night.  In a 19day period she had grown 5oz.  Mom had noted increased fussiness to refusal at the breast.  Still pumping enough milk to satisfy her and sleeping routine had continued 7p-3am.      Assessment: Email conversations included adding in bottles and weight responded in the last week to an ounce per day. Josee has returned to the breast and been less averse. Fed left breast about an hour ago.  To right breast, latched, Josee would prefer a faster letdown but stayed at the breast 5 minutes then tugged a bit and returned an additional 2 minutes for 2.5oz.  She was not interested in the left.  Changed, dressed and offered left and still no interest.  Content with looking around, pacifier in car seat.    Plan:  Growth slowed for March and has increased with moms devotion.  Back to breast without timings, offering frequently.   Stimulations per day per her own magic number to be kept in mind.  Increasing bottles of pumped milk to 4oz is ok as long as it is available - that does not include frozen milk.  Formula is not indicated at all.  Follow up in group weekly  and outpatient as needed

## 2022-04-09 ENCOUNTER — APPOINTMENT (OUTPATIENT)
Dept: RADIOLOGY | Facility: MEDICAL CENTER | Age: 37
End: 2022-04-09
Attending: EMERGENCY MEDICINE
Payer: COMMERCIAL

## 2022-04-09 ENCOUNTER — HOSPITAL ENCOUNTER (EMERGENCY)
Facility: MEDICAL CENTER | Age: 37
End: 2022-04-09
Attending: EMERGENCY MEDICINE
Payer: COMMERCIAL

## 2022-04-09 VITALS
RESPIRATION RATE: 16 BRPM | SYSTOLIC BLOOD PRESSURE: 112 MMHG | HEART RATE: 83 BPM | DIASTOLIC BLOOD PRESSURE: 73 MMHG | HEIGHT: 64 IN | WEIGHT: 155 LBS | BODY MASS INDEX: 26.46 KG/M2 | OXYGEN SATURATION: 96 % | TEMPERATURE: 98.6 F

## 2022-04-09 DIAGNOSIS — O03.9 MISCARRIAGE: ICD-10-CM

## 2022-04-09 DIAGNOSIS — N93.9 VAGINAL BLEEDING: ICD-10-CM

## 2022-04-09 LAB
ALBUMIN SERPL BCP-MCNC: 4.1 G/DL (ref 3.2–4.9)
ALBUMIN/GLOB SERPL: 1.3 G/DL
ALP SERPL-CCNC: 88 U/L (ref 30–99)
ALT SERPL-CCNC: 10 U/L (ref 2–50)
ANION GAP SERPL CALC-SCNC: 10 MMOL/L (ref 7–16)
APPEARANCE UR: CLEAR
AST SERPL-CCNC: 19 U/L (ref 12–45)
B-HCG SERPL-ACNC: 8.9 MIU/ML (ref 0–5)
BACTERIA #/AREA URNS HPF: NEGATIVE /HPF
BASOPHILS # BLD AUTO: 0.3 % (ref 0–1.8)
BASOPHILS # BLD: 0.02 K/UL (ref 0–0.12)
BILIRUB SERPL-MCNC: 0.5 MG/DL (ref 0.1–1.5)
BILIRUB UR QL STRIP.AUTO: NEGATIVE
BUN SERPL-MCNC: 11 MG/DL (ref 8–22)
CALCIUM SERPL-MCNC: 9.6 MG/DL (ref 8.5–10.5)
CHLORIDE SERPL-SCNC: 105 MMOL/L (ref 96–112)
CO2 SERPL-SCNC: 22 MMOL/L (ref 20–33)
COLOR UR: YELLOW
CREAT SERPL-MCNC: 0.47 MG/DL (ref 0.5–1.4)
EOSINOPHIL # BLD AUTO: 0.13 K/UL (ref 0–0.51)
EOSINOPHIL NFR BLD: 2.1 % (ref 0–6.9)
EPI CELLS #/AREA URNS HPF: NEGATIVE /HPF
ERYTHROCYTE [DISTWIDTH] IN BLOOD BY AUTOMATED COUNT: 41.5 FL (ref 35.9–50)
GFR SERPLBLD CREATININE-BSD FMLA CKD-EPI: 128 ML/MIN/1.73 M 2
GLOBULIN SER CALC-MCNC: 3.1 G/DL (ref 1.9–3.5)
GLUCOSE SERPL-MCNC: 127 MG/DL (ref 65–99)
GLUCOSE UR STRIP.AUTO-MCNC: NEGATIVE MG/DL
HCT VFR BLD AUTO: 40.9 % (ref 37–47)
HGB BLD-MCNC: 13.5 G/DL (ref 12–16)
HYALINE CASTS #/AREA URNS LPF: ABNORMAL /LPF
IMM GRANULOCYTES # BLD AUTO: 0.01 K/UL (ref 0–0.11)
IMM GRANULOCYTES NFR BLD AUTO: 0.2 % (ref 0–0.9)
KETONES UR STRIP.AUTO-MCNC: NEGATIVE MG/DL
LEUKOCYTE ESTERASE UR QL STRIP.AUTO: ABNORMAL
LYMPHOCYTES # BLD AUTO: 0.89 K/UL (ref 1–4.8)
LYMPHOCYTES NFR BLD: 14.4 % (ref 22–41)
MCH RBC QN AUTO: 29 PG (ref 27–33)
MCHC RBC AUTO-ENTMCNC: 33 G/DL (ref 33.6–35)
MCV RBC AUTO: 88 FL (ref 81.4–97.8)
MICRO URNS: ABNORMAL
MONOCYTES # BLD AUTO: 0.64 K/UL (ref 0–0.85)
MONOCYTES NFR BLD AUTO: 10.4 % (ref 0–13.4)
NEUTROPHILS # BLD AUTO: 4.47 K/UL (ref 2–7.15)
NEUTROPHILS NFR BLD: 72.6 % (ref 44–72)
NITRITE UR QL STRIP.AUTO: NEGATIVE
NRBC # BLD AUTO: 0 K/UL
NRBC BLD-RTO: 0 /100 WBC
NUMBER OF RH DOSES IND 8505RD: NORMAL
PH UR STRIP.AUTO: 5 [PH] (ref 5–8)
PLATELET # BLD AUTO: 216 K/UL (ref 164–446)
PMV BLD AUTO: 10.9 FL (ref 9–12.9)
POTASSIUM SERPL-SCNC: 3.9 MMOL/L (ref 3.6–5.5)
PROT SERPL-MCNC: 7.2 G/DL (ref 6–8.2)
PROT UR QL STRIP: NEGATIVE MG/DL
RBC # BLD AUTO: 4.65 M/UL (ref 4.2–5.4)
RBC # URNS HPF: ABNORMAL /HPF
RBC UR QL AUTO: ABNORMAL
RH BLD: NORMAL
SODIUM SERPL-SCNC: 137 MMOL/L (ref 135–145)
SP GR UR STRIP.AUTO: 1.01
UROBILINOGEN UR STRIP.AUTO-MCNC: 0.2 MG/DL
WBC # BLD AUTO: 6.2 K/UL (ref 4.8–10.8)
WBC #/AREA URNS HPF: ABNORMAL /HPF

## 2022-04-09 PROCEDURE — 85025 COMPLETE CBC W/AUTO DIFF WBC: CPT

## 2022-04-09 PROCEDURE — 76801 OB US < 14 WKS SINGLE FETUS: CPT

## 2022-04-09 PROCEDURE — 84702 CHORIONIC GONADOTROPIN TEST: CPT

## 2022-04-09 PROCEDURE — 36415 COLL VENOUS BLD VENIPUNCTURE: CPT

## 2022-04-09 PROCEDURE — 99284 EMERGENCY DEPT VISIT MOD MDM: CPT

## 2022-04-09 PROCEDURE — 81001 URINALYSIS AUTO W/SCOPE: CPT

## 2022-04-09 PROCEDURE — 80053 COMPREHEN METABOLIC PANEL: CPT

## 2022-04-09 PROCEDURE — 86901 BLOOD TYPING SEROLOGIC RH(D): CPT

## 2022-04-09 ASSESSMENT — LIFESTYLE VARIABLES: DO YOU DRINK ALCOHOL: NO

## 2022-04-09 ASSESSMENT — ENCOUNTER SYMPTOMS
ABDOMINAL PAIN: 1
FEVER: 0

## 2022-04-09 NOTE — ED TRIAGE NOTES
Chief Complaint   Patient presents with   • Vaginal Bleeding     X 3 days. States started w/light vaginal bleeding then am became heavier and also noted some clots. Positive on home pregnancy test. This am pt soaked 3 pads within 2 hours.    • Abdominal Cramping     In lower abdomen rates as 5/10     Pt is . NAD. VSS. Educated on triage process. Instructed to notify staff for any worsening symptoms. Denies any recent travel. Denies exposure to known covid positive patients. Denies any respiratory symptoms. LMP

## 2022-04-09 NOTE — ED PROVIDER NOTES
ED Provider Note    Scribed for Lee Ann De Oliveira M.D. by Karson Jacob. 2022, 12:30 PM.    Primary care provider: Sandy Winchester P.A.-C.  Means of arrival: Walk-in  History obtained from: Patient  History limited by: None    CHIEF COMPLAINT  Chief Complaint   Patient presents with   • Vaginal Bleeding     X 3 days. States started w/light vaginal bleeding then am became heavier and also noted some clots. Positive on home pregnancy test. This am pt soaked 3 pads within 2 hours.    • Abdominal Cramping     In lower abdomen rates as 5/10       HPI  Ana Marroquin is a 36 y.o.  female who presents to the Emergency Department for evaluation of vaginal bleeding onset three days ago. She states that the bleeding was initially light over the past two days, but began getting progressively heavier this morning. Today she has bled through three pads within two hours parentheses this morning and parentheses, bleeding subsequently slowed down. She has associated symptoms of abdominal cramping. She denies any fever or other symptoms. She notes that her last menstrual period was on 2022 and she has taken an at home pregnancy test that was positive. This is her second pregnancy, her first pregnancy carried to term three years ago. There are no known alleviating or exacerbating factors.    REVIEW OF SYSTEMS  Review of Systems   Constitutional: Negative for fever.   Gastrointestinal: Positive for abdominal pain (cramping).   Genitourinary:        Positive for vaginal bleeding.   All other systems reviewed and are negative.    PAST MEDICAL HISTORY   has a past medical history of Depression, Environmental allergies, GDM, class A2 (2018), and Migraines.    SURGICAL HISTORY  patient denies any surgical history    SOCIAL HISTORY  Social History     Tobacco Use   • Smoking status: Never Smoker   • Smokeless tobacco: Never Used   Substance Use Topics   • Alcohol use: Yes   • Drug use: No      Social History  "    Substance and Sexual Activity   Drug Use No       FAMILY HISTORY  None pertinent.     CURRENT MEDICATIONS  Home Medications     Reviewed by Alayna Olsen R.N. (Registered Nurse) on 22 at 1132  Med List Status: Complete   Medication Last Dose Status   Prenatal Multivit-Min-Fe-FA (PRE-JONATAN PO)  Active                 ALLERGIES  No Known Allergies    PHYSICAL EXAM  VITAL SIGNS: /79   Pulse 84   Temp 37.1 °C (98.7 °F) (Temporal)   Resp 16   Ht 1.626 m (5' 4\")   Wt 70.3 kg (155 lb)   LMP 2022   SpO2 97%   BMI 26.61 kg/m²   Vitals reviewed by myself.  Physical Exam  Nursing note and vitals reviewed.  Constitutional: Well-developed and well-nourished. No acute distress.   HENT: Head is normocephalic and atraumatic.  Eyes: extra-ocular movements intact  Cardiovascular: Regular rate and  regular rhythm. No murmur heard.  Pulmonary/Chest: Breath sounds normal. No wheezes or rales.   Abdominal: Soft and non-tender. No distention.    Pelvic: notable for blood clot in the cervical office which was removed with no retained blood clots in the cervical office, no active bleeding after removal of the blood clot   Musculoskeletal: Extremities exhibit normal range of motion without edema or tenderness.   Neurological: Awake and alert  Skin: Skin is warm and dry. No rash.       DIAGNOSTIC STUDIES  LABS  Labs Reviewed   CBC WITH DIFFERENTIAL - Abnormal; Notable for the following components:       Result Value    MCHC 33.0 (*)     Neutrophils-Polys 72.60 (*)     Lymphocytes 14.40 (*)     Lymphs (Absolute) 0.89 (*)     All other components within normal limits   COMP METABOLIC PANEL - Abnormal; Notable for the following components:    Glucose 127 (*)     Creatinine 0.47 (*)     All other components within normal limits   HCG QUANTITATIVE - Abnormal; Notable for the following components:    Bhcg 8.9 (*)     All other components within normal limits   URINALYSIS,CULTURE IF INDICATED - Abnormal; Notable for " the following components:    Leukocyte Esterase Trace (*)     Occult Blood Large (*)     All other components within normal limits    Narrative:     Indication for culture:->Pregnant women: fever and/or  asymptomatic screening   URINE MICROSCOPIC (W/UA) - Abnormal; Notable for the following components:    -150 (*)     All other components within normal limits    Narrative:     Indication for culture:->Pregnant women: fever and/or  asymptomatic screening   RH TYPE FOR RHOGAM FROM E.D.    Narrative:     Print Consent?->No   ESTIMATED GFR     All labs reviewed by me.      RADIOLOGY  US-OB 1ST TRIMESTER SINGLE GEST Is the patient pregnant? Yes   Final Result      1.  No evidence of intrauterine pregnancy.   2.  Small intramural fibroids.           The radiologist's interpretation of all radiological studies have been reviewed by me.      REASSESSMENT    12:30 PM - Patient seen and examined at bedside. Discussed plan of care, including labs and imaging. Patient agrees to the plan of care.      12:40 PM - ERP at bedside, pelvic exam completed at this time. Patient tolerated well.     3:14 PM - I reevaluated the patient at bedside.  I discussed the patient's diagnostic study results. I discussed plan for discharge and follow up as outlined below. The patient is stable for discharge at this time and will return for any new or worsening symptoms. Patient verbalizes understanding and support with my plan for discharge.       COURSE & MEDICAL DECISION MAKING  Nursing notes, VS, PMSFHx reviewed in chart.    Patient is a 36-year-old female who comes in for evaluation of bleeding and pregnancy. Differential diagnosis includes completed miscarriage, subchronic hemorrhage, incomplete miscarriage. Diagnostic workup includes labs and pelvic ultrasound.    Patient initial vitals are within normal limits.patient is Rh positive and therefore program is not indicated. Ultrasound returns and demonstrates no evidence of intrauterine  pregnancy. Beta hCG is eight, this given the history and ultrasound demonstrating no IUP is consistent with likely completed miscarriage. I advised patient she will need to follow-up with gynecology to ensure that her hCG trends down to zero. Patient is amenable to this plan. She is given strict return precautions and discharge in stable condition.     The patient will return for new or worsening symptoms and is stable at the time of discharge.        DISPOSITION:  Patient will be discharged home in stable condition.    FOLLOW UP:  Sandy Winchester P.A.-C.  [address]  [address]  [place] [address]  [phone]          [facility]  [address]  [facility] [id number] [phone]          FINAL IMPRESSION  1. Miscarriage    2. Vaginal bleeding          Karson EASON (Scribe), am scribing for, and in the presence of, Lee Ann De Oliveira M.D..    Electronically signed by: Karson Jacob (Farhana), 4/9/2022    ILee Ann M.D. personally performed the services described in this documentation, as scribed by Karson Jacob in my presence, and it is both accurate and complete.     The note accurately reflects work and decisions made by me.  Lee Ann De Oliveira M.D.  4/9/2022  8:41 PM

## 2022-06-20 ENCOUNTER — HOSPITAL ENCOUNTER (EMERGENCY)
Facility: MEDICAL CENTER | Age: 37
End: 2022-06-21
Attending: EMERGENCY MEDICINE
Payer: COMMERCIAL

## 2022-06-20 ENCOUNTER — APPOINTMENT (OUTPATIENT)
Dept: RADIOLOGY | Facility: MEDICAL CENTER | Age: 37
End: 2022-06-20
Attending: EMERGENCY MEDICINE
Payer: COMMERCIAL

## 2022-06-20 DIAGNOSIS — D25.9 UTERINE LEIOMYOMA, UNSPECIFIED LOCATION: ICD-10-CM

## 2022-06-20 DIAGNOSIS — O20.0 THREATENED MISCARRIAGE IN EARLY PREGNANCY: ICD-10-CM

## 2022-06-20 DIAGNOSIS — O46.8X1 SUBCHORIONIC HEMATOMA IN FIRST TRIMESTER, SINGLE OR UNSPECIFIED FETUS: ICD-10-CM

## 2022-06-20 DIAGNOSIS — O41.8X10 SUBCHORIONIC HEMATOMA IN FIRST TRIMESTER, SINGLE OR UNSPECIFIED FETUS: ICD-10-CM

## 2022-06-20 LAB
ALBUMIN SERPL BCP-MCNC: 4.2 G/DL (ref 3.2–4.9)
ALBUMIN/GLOB SERPL: 1.4 G/DL
ALP SERPL-CCNC: 64 U/L (ref 30–99)
ALT SERPL-CCNC: 11 U/L (ref 2–50)
ANION GAP SERPL CALC-SCNC: 9 MMOL/L (ref 7–16)
APPEARANCE UR: CLEAR
AST SERPL-CCNC: 17 U/L (ref 12–45)
B-HCG SERPL-ACNC: 2973 MIU/ML (ref 0–5)
BASOPHILS # BLD AUTO: 0.3 % (ref 0–1.8)
BASOPHILS # BLD: 0.02 K/UL (ref 0–0.12)
BILIRUB SERPL-MCNC: 0.3 MG/DL (ref 0.1–1.5)
BILIRUB UR QL STRIP.AUTO: NEGATIVE
BUN SERPL-MCNC: 13 MG/DL (ref 8–22)
CALCIUM SERPL-MCNC: 9.8 MG/DL (ref 8.5–10.5)
CHLORIDE SERPL-SCNC: 103 MMOL/L (ref 96–112)
CO2 SERPL-SCNC: 23 MMOL/L (ref 20–33)
COLOR UR: YELLOW
CREAT SERPL-MCNC: 0.5 MG/DL (ref 0.5–1.4)
EOSINOPHIL # BLD AUTO: 0.07 K/UL (ref 0–0.51)
EOSINOPHIL NFR BLD: 1 % (ref 0–6.9)
ERYTHROCYTE [DISTWIDTH] IN BLOOD BY AUTOMATED COUNT: 42.4 FL (ref 35.9–50)
GFR SERPLBLD CREATININE-BSD FMLA CKD-EPI: 126 ML/MIN/1.73 M 2
GLOBULIN SER CALC-MCNC: 2.9 G/DL (ref 1.9–3.5)
GLUCOSE SERPL-MCNC: 103 MG/DL (ref 65–99)
GLUCOSE UR STRIP.AUTO-MCNC: NEGATIVE MG/DL
HCT VFR BLD AUTO: 38.5 % (ref 37–47)
HGB BLD-MCNC: 13.1 G/DL (ref 12–16)
IMM GRANULOCYTES # BLD AUTO: 0.01 K/UL (ref 0–0.11)
IMM GRANULOCYTES NFR BLD AUTO: 0.1 % (ref 0–0.9)
KETONES UR STRIP.AUTO-MCNC: ABNORMAL MG/DL
LEUKOCYTE ESTERASE UR QL STRIP.AUTO: NEGATIVE
LIPASE SERPL-CCNC: 38 U/L (ref 11–82)
LYMPHOCYTES # BLD AUTO: 1.33 K/UL (ref 1–4.8)
LYMPHOCYTES NFR BLD: 18.6 % (ref 22–41)
MCH RBC QN AUTO: 29.4 PG (ref 27–33)
MCHC RBC AUTO-ENTMCNC: 34 G/DL (ref 33.6–35)
MCV RBC AUTO: 86.3 FL (ref 81.4–97.8)
MICRO URNS: ABNORMAL
MONOCYTES # BLD AUTO: 0.43 K/UL (ref 0–0.85)
MONOCYTES NFR BLD AUTO: 6 % (ref 0–13.4)
NEUTROPHILS # BLD AUTO: 5.28 K/UL (ref 2–7.15)
NEUTROPHILS NFR BLD: 74 % (ref 44–72)
NITRITE UR QL STRIP.AUTO: NEGATIVE
NRBC # BLD AUTO: 0 K/UL
NRBC BLD-RTO: 0 /100 WBC
PH UR STRIP.AUTO: 5 [PH] (ref 5–8)
PLATELET # BLD AUTO: 241 K/UL (ref 164–446)
PMV BLD AUTO: 10.8 FL (ref 9–12.9)
POTASSIUM SERPL-SCNC: 3.9 MMOL/L (ref 3.6–5.5)
PROT SERPL-MCNC: 7.1 G/DL (ref 6–8.2)
PROT UR QL STRIP: NEGATIVE MG/DL
RBC # BLD AUTO: 4.46 M/UL (ref 4.2–5.4)
RBC UR QL AUTO: NEGATIVE
SODIUM SERPL-SCNC: 135 MMOL/L (ref 135–145)
SP GR UR STRIP.AUTO: 1.03
UROBILINOGEN UR STRIP.AUTO-MCNC: 0.2 MG/DL
WBC # BLD AUTO: 7.1 K/UL (ref 4.8–10.8)

## 2022-06-20 PROCEDURE — 83690 ASSAY OF LIPASE: CPT

## 2022-06-20 PROCEDURE — 76801 OB US < 14 WKS SINGLE FETUS: CPT

## 2022-06-20 PROCEDURE — 85025 COMPLETE CBC W/AUTO DIFF WBC: CPT

## 2022-06-20 PROCEDURE — 99284 EMERGENCY DEPT VISIT MOD MDM: CPT

## 2022-06-20 PROCEDURE — 84702 CHORIONIC GONADOTROPIN TEST: CPT

## 2022-06-20 PROCEDURE — 36415 COLL VENOUS BLD VENIPUNCTURE: CPT

## 2022-06-20 PROCEDURE — 80053 COMPREHEN METABOLIC PANEL: CPT

## 2022-06-20 PROCEDURE — 81003 URINALYSIS AUTO W/O SCOPE: CPT

## 2022-06-20 ASSESSMENT — FIBROSIS 4 INDEX: FIB4 SCORE: 0.95

## 2022-06-21 VITALS
OXYGEN SATURATION: 97 % | HEART RATE: 73 BPM | WEIGHT: 159.61 LBS | SYSTOLIC BLOOD PRESSURE: 119 MMHG | DIASTOLIC BLOOD PRESSURE: 74 MMHG | BODY MASS INDEX: 27.25 KG/M2 | HEIGHT: 64 IN | TEMPERATURE: 98.6 F | RESPIRATION RATE: 16 BRPM

## 2022-06-21 NOTE — ED TRIAGE NOTES
"Chief Complaint   Patient presents with   • Pelvic Pain     Patient is having right sided pelvic pain intermittently for the past week. She is 5 weeks pregnant and had previous miscarriage in April. Denies blood or discharge       Patient to triage ambulatory with a steady gait, AAOx4, Appropriate precautions in place.     Explained wait time and triage process. Placed back in lobby. Told to notify ED tech or RN of any changes, verbalized understanding.    /75   Pulse 86   Temp 37.2 °C (98.9 °F) (Temporal)   Resp 16   Ht 1.626 m (5' 4\")   Wt 72.4 kg (159 lb 9.8 oz)   LMP 03/05/2022   SpO2 98%   BMI 27.40 kg/m²     "

## 2022-06-21 NOTE — ED PROVIDER NOTES
"ED Provider Note    ED Provider Note    Primary care provider: Sandy Winchester P.A.-C.  Means of arrival: Walk-in  History obtained from: Patient    CHIEF COMPLAINT  Chief Complaint   Patient presents with   • Pelvic Pain     Patient is having right sided pelvic pain intermittently for the past week. She is 5 weeks pregnant and had previous miscarriage in April. Denies blood or discharge     Seen at 9:17 PM.   HPI  Ana Marroquin is a 36 y.o. female -0-1-1 EGA 5 weeks by LMP who presents to the Emergency Department intermittent right lower quadrant abdominal cramping for the past several days.  The patient denies any fevers, chills, nausea, vomiting, vaginal bleeding, vaginal discharge, dysuria, numbness or focal weakness.  She had a spontaneous AB at approximately 5 weeks with her last pregnancy which was 3 months ago.  She had a full-term pregnancy on her first pregnancy without complications.    REVIEW OF SYSTEMS  See HPI,   Remainder of ROS negative.     PAST MEDICAL HISTORY   has a past medical history of Depression, Environmental allergies, GDM, class A2 (2018), and Migraines.    SURGICAL HISTORY  patient denies any surgical history    SOCIAL HISTORY  Social History     Tobacco Use   • Smoking status: Never Smoker   • Smokeless tobacco: Never Used   Vaping Use   • Vaping Use: Never used   Substance Use Topics   • Alcohol use: Not Currently   • Drug use: No      Social History     Substance and Sexual Activity   Drug Use No       FAMILY HISTORY  History reviewed. No pertinent family history.    CURRENT MEDICATIONS  Reviewed.  See Encounter Summary.     ALLERGIES  No Known Allergies    PHYSICAL EXAM  VITAL SIGNS: /66   Pulse 68   Temp 36.3 °C (97.4 °F) (Temporal)   Resp 18   Ht 1.626 m (5' 4\")   Wt 72.4 kg (159 lb 9.8 oz)   LMP 2022   SpO2 99%   BMI 27.40 kg/m²   Constitutional: Awake, alert in no apparent distress.  HENT: Normocephalic, Bilateral external ears normal. Nose normal. "   Eyes: Conjunctiva normal, non-icteric, EOMI.    Thorax & Lungs: Easy unlabored respirations, Clear to ascultation bilaterally.  Cardiovascular: Regular rate, Regular rhythm, No murmurs, rubs or gallops. Bilateral pulses symmetrical.   Abdomen:  Soft, nontender, nondistended, normal active bowel sounds.   :    Skin: Visualized skin is  Dry, No erythema, No rash.   Musculoskeletal:   No cyanosis, clubbing or edema. No leg asymmetry.   Neurologic: Alert, Grossly non-focal.   Psychiatric: Normal affect, Normal mood  Lymphatic:  No cervical LAD      RADIOLOGY  US-OB 1ST TRIMESTER WITH TRANSVAGINAL (COMBO)   Final Result         1.  Single Intrauterine gestational sac at 5 weeks 2 days for estimated date of delivery February 18, 2023 .   2.  Small subchorionic hemorrhage   3.  Fibroid uterine changes            COURSE & MEDICAL DECISION MAKING  Pertinent Labs & Imaging studies reviewed. (See chart for details)    Differential diagnoses include but are not limited to: Threatened AB, ectopic    9:17 PM - Medical record reviewed, patient seen and examined at bedside.    Decision Making:  This is a pleasant 36 y.o. year old female who presents with intermittent right lower quadrant abdominal pain.  Examination and intermittent nature not concerning for acute appendicitis.  Quantitative hCG at 2973 which puts the patient in the range between 2 and 5 weeks pregnancy.  Patient is Rh+, no indication for RhoGAM.  Pelvic ultrasound shows fibroid uterus, small subchorionic hemorrhage and gestational sac at 5 weeks 2 days.    Explained to the patient the findings on ultrasound.  This does put her at higher risk for spontaneous miscarriage.  Recommend pelvic rest.  Tylenol as needed for any cramping.  Avoid ibuprofen, follow-up with OB/GYN in the next few weeks.  She does not necessarily need a repeat visit in the ER as she does definitively have an IUP.  Recommend return visits for soaking 4 pads in 4 hours, severe pain or any  other concern.  '  Discharge Medications:  New Prescriptions    No medications on file       The patient was discharged home (see d/c instructions) was told to return immediately for any signs or symptoms listed, or any worsening at all.  The patient verbally agreed to the discharge precautions and follow-up plan which is documented in EPIC.        FINAL IMPRESSION  1. Threatened miscarriage in early pregnancy    2. Subchorionic hematoma in first trimester, single or unspecified fetus    3. Uterine leiomyoma, unspecified location